# Patient Record
Sex: FEMALE | Race: OTHER | HISPANIC OR LATINO | Employment: PART TIME | URBAN - METROPOLITAN AREA
[De-identification: names, ages, dates, MRNs, and addresses within clinical notes are randomized per-mention and may not be internally consistent; named-entity substitution may affect disease eponyms.]

---
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
## 2019-04-19 ENCOUNTER — OFFICE VISIT (OUTPATIENT)
Dept: URGENT CARE | Age: 19
End: 2019-04-19
Payer: COMMERCIAL

## 2019-04-19 VITALS
BODY MASS INDEX: 19.32 KG/M2 | HEIGHT: 62 IN | DIASTOLIC BLOOD PRESSURE: 69 MMHG | OXYGEN SATURATION: 99 % | TEMPERATURE: 98.9 F | WEIGHT: 105 LBS | HEART RATE: 88 BPM | RESPIRATION RATE: 18 BRPM | SYSTOLIC BLOOD PRESSURE: 114 MMHG

## 2019-04-19 DIAGNOSIS — J02.9 SORE THROAT: ICD-10-CM

## 2019-04-19 DIAGNOSIS — J02.0 STREP PHARYNGITIS: Primary | ICD-10-CM

## 2019-04-19 LAB — S PYO AG THROAT QL: NEGATIVE

## 2019-04-19 PROCEDURE — 87430 STREP A AG IA: CPT | Performed by: PHYSICIAN ASSISTANT

## 2019-04-19 PROCEDURE — 99203 OFFICE O/P NEW LOW 30 MIN: CPT | Performed by: PHYSICIAN ASSISTANT

## 2019-04-19 PROCEDURE — 87147 CULTURE TYPE IMMUNOLOGIC: CPT | Performed by: PHYSICIAN ASSISTANT

## 2019-04-19 PROCEDURE — 87070 CULTURE OTHR SPECIMN AEROBIC: CPT | Performed by: PHYSICIAN ASSISTANT

## 2019-04-19 RX ORDER — AMOXICILLIN 500 MG/1
500 CAPSULE ORAL EVERY 12 HOURS SCHEDULED
Qty: 20 CAPSULE | Refills: 0 | Status: SHIPPED | OUTPATIENT
Start: 2019-04-19 | End: 2019-04-29

## 2019-04-21 LAB — BACTERIA THROAT CULT: ABNORMAL

## 2019-07-02 ENCOUNTER — LAB REQUISITION (OUTPATIENT)
Dept: LAB | Facility: HOSPITAL | Age: 19
End: 2019-07-02
Payer: COMMERCIAL

## 2019-07-02 DIAGNOSIS — Z11.3 ENCOUNTER FOR SCREENING FOR INFECTIONS WITH PREDOMINANTLY SEXUAL MODE OF TRANSMISSION: ICD-10-CM

## 2019-07-02 PROCEDURE — 87591 N.GONORRHOEAE DNA AMP PROB: CPT | Performed by: PEDIATRICS

## 2019-07-02 PROCEDURE — 87491 CHLMYD TRACH DNA AMP PROBE: CPT | Performed by: PEDIATRICS

## 2019-07-05 LAB — MISCELLANEOUS LAB TEST RESULT: NORMAL

## 2019-09-16 NOTE — PROGRESS NOTES
Pt pleasant during interaction  Visible in milieu throughout morning and attending groups  Denies SI and depression  Contracts for safety  Feeling ready for discharge today  No questions/concerns at this time  Spontaneous, unlabored and symmetrical

## 2021-02-10 ENCOUNTER — APPOINTMENT (EMERGENCY)
Dept: ULTRASOUND IMAGING | Facility: HOSPITAL | Age: 21
End: 2021-02-10
Payer: COMMERCIAL

## 2021-02-10 ENCOUNTER — HOSPITAL ENCOUNTER (EMERGENCY)
Facility: HOSPITAL | Age: 21
Discharge: HOME | End: 2021-02-10
Attending: EMERGENCY MEDICINE | Admitting: EMERGENCY MEDICINE
Payer: COMMERCIAL

## 2021-02-10 VITALS
HEART RATE: 81 BPM | SYSTOLIC BLOOD PRESSURE: 125 MMHG | WEIGHT: 105.82 LBS | DIASTOLIC BLOOD PRESSURE: 75 MMHG | BODY MASS INDEX: 19.35 KG/M2 | TEMPERATURE: 98.4 F | OXYGEN SATURATION: 98 % | RESPIRATION RATE: 18 BRPM

## 2021-02-10 DIAGNOSIS — O20.0 THREATENED MISCARRIAGE: Primary | ICD-10-CM

## 2021-02-10 LAB
ABO GROUP BLD: NORMAL
B-HCG SERPL-ACNC: 11 MIU/ML
BACTERIA UR QL AUTO: ABNORMAL /HPF
BASOPHILS # BLD AUTO: 0.02 THOUSANDS/ΜL (ref 0–0.1)
BASOPHILS NFR BLD AUTO: 0 % (ref 0–1)
BILIRUB UR QL STRIP: NEGATIVE
CLARITY UR: CLEAR
COLOR UR: YELLOW
EOSINOPHIL # BLD AUTO: 0.03 THOUSAND/ΜL (ref 0–0.61)
EOSINOPHIL NFR BLD AUTO: 0 % (ref 0–6)
ERYTHROCYTE [DISTWIDTH] IN BLOOD BY AUTOMATED COUNT: 12.7 % (ref 11.6–15.1)
EXT PREG TEST URINE: NEGATIVE
EXT. CONTROL ED NAV: NORMAL
GLUCOSE UR STRIP-MCNC: NEGATIVE MG/DL
HCT VFR BLD AUTO: 38.4 % (ref 34.8–46.1)
HGB BLD-MCNC: 12.7 G/DL (ref 11.5–15.4)
HGB UR QL STRIP.AUTO: ABNORMAL
IMM GRANULOCYTES # BLD AUTO: 0.02 THOUSAND/UL (ref 0–0.2)
IMM GRANULOCYTES NFR BLD AUTO: 0 % (ref 0–2)
KETONES UR STRIP-MCNC: NEGATIVE MG/DL
LEUKOCYTE ESTERASE UR QL STRIP: NEGATIVE
LYMPHOCYTES # BLD AUTO: 1.58 THOUSANDS/ΜL (ref 0.6–4.47)
LYMPHOCYTES NFR BLD AUTO: 17 % (ref 14–44)
MCH RBC QN AUTO: 30.2 PG (ref 26.8–34.3)
MCHC RBC AUTO-ENTMCNC: 33.1 G/DL (ref 31.4–37.4)
MCV RBC AUTO: 91 FL (ref 82–98)
MONOCYTES # BLD AUTO: 0.64 THOUSAND/ΜL (ref 0.17–1.22)
MONOCYTES NFR BLD AUTO: 7 % (ref 4–12)
NEUTROPHILS # BLD AUTO: 6.92 THOUSANDS/ΜL (ref 1.85–7.62)
NEUTS SEG NFR BLD AUTO: 76 % (ref 43–75)
NITRITE UR QL STRIP: NEGATIVE
NON-SQ EPI CELLS URNS QL MICRO: ABNORMAL /HPF
NRBC BLD AUTO-RTO: 0 /100 WBCS
PH UR STRIP.AUTO: 5.5 [PH] (ref 4.5–8)
PLATELET # BLD AUTO: 187 THOUSANDS/UL (ref 149–390)
PMV BLD AUTO: 11 FL (ref 8.9–12.7)
PROT UR STRIP-MCNC: NEGATIVE MG/DL
RBC # BLD AUTO: 4.21 MILLION/UL (ref 3.81–5.12)
RBC #/AREA URNS AUTO: ABNORMAL /HPF
RH BLD: POSITIVE
SP GR UR STRIP.AUTO: >=1.03 (ref 1–1.03)
UROBILINOGEN UR QL STRIP.AUTO: 0.2 E.U./DL
WBC # BLD AUTO: 9.21 THOUSAND/UL (ref 4.31–10.16)
WBC #/AREA URNS AUTO: ABNORMAL /HPF

## 2021-02-10 PROCEDURE — 87086 URINE CULTURE/COLONY COUNT: CPT

## 2021-02-10 PROCEDURE — 85025 COMPLETE CBC W/AUTO DIFF WBC: CPT | Performed by: EMERGENCY MEDICINE

## 2021-02-10 PROCEDURE — 99284 EMERGENCY DEPT VISIT MOD MDM: CPT

## 2021-02-10 PROCEDURE — 81025 URINE PREGNANCY TEST: CPT | Performed by: EMERGENCY MEDICINE

## 2021-02-10 PROCEDURE — 99282 EMERGENCY DEPT VISIT SF MDM: CPT | Performed by: EMERGENCY MEDICINE

## 2021-02-10 PROCEDURE — 81001 URINALYSIS AUTO W/SCOPE: CPT

## 2021-02-10 PROCEDURE — 86900 BLOOD TYPING SEROLOGIC ABO: CPT | Performed by: EMERGENCY MEDICINE

## 2021-02-10 PROCEDURE — 84702 CHORIONIC GONADOTROPIN TEST: CPT | Performed by: EMERGENCY MEDICINE

## 2021-02-10 PROCEDURE — 86901 BLOOD TYPING SEROLOGIC RH(D): CPT | Performed by: EMERGENCY MEDICINE

## 2021-02-10 PROCEDURE — 76815 OB US LIMITED FETUS(S): CPT

## 2021-02-10 PROCEDURE — 36415 COLL VENOUS BLD VENIPUNCTURE: CPT | Performed by: EMERGENCY MEDICINE

## 2021-02-10 NOTE — ED PROVIDER NOTES
History  Chief Complaint   Patient presents with    Vaginal Bleeding - Pregnant     Pt reports positive pregnancy test on Sunday, LMP 1/8/21 and abdominal cramping and spotting that started today  Pt reports passing two small blood clots, denies pad saturation  22 yo female c/o onset of vaginal bleeding today, spotting first, then passed two clots, and now volume similar to menses  LMP 1/8/21 and she said she had positive pregnancy on 2/7/21  History provided by:  Patient  Vaginal Bleeding  Quality:  Clots and typical of menses  Severity:  Moderate  Onset quality:  Gradual  Duration:  1 day  Timing:  Intermittent  Progression:  Waxing and waning  Chronicity:  New  Possible pregnancy: yes (LMP 1/8/21)    Associated symptoms: no abdominal pain, no dizziness, no dysuria, no fever and no nausea        Prior to Admission Medications   Prescriptions Last Dose Informant Patient Reported? Taking?   escitalopram (LEXAPRO) 5 mg tablet Not Taking at Unknown time  No No   Sig: Take 1 tablet (5 mg total) by mouth daily   Patient not taking: Reported on 4/19/2019   lidocaine viscous (XYLOCAINE) 2 % mucosal solution Not Taking at Unknown time  No No   Sig: Swish and spit 15 mL 4 (four) times a day as needed for mild pain   Patient not taking: Reported on 2/10/2021      Facility-Administered Medications: None       Past Medical History:   Diagnosis Date    Migraine        History reviewed  No pertinent surgical history  Family History   Problem Relation Age of Onset    Deep vein thrombosis Maternal Grandfather      I have reviewed and agree with the history as documented  E-Cigarette/Vaping    E-Cigarette Use Never User      E-Cigarette/Vaping Substances     Social History     Tobacco Use    Smoking status: Never Smoker    Smokeless tobacco: Never Used   Substance Use Topics    Alcohol use: No    Drug use: No       Review of Systems   Constitutional: Negative for appetite change, chills and fever  HENT: Negative for sore throat  Respiratory: Negative for cough, shortness of breath and wheezing  Cardiovascular: Negative for chest pain and palpitations  Gastrointestinal: Negative for abdominal pain, diarrhea, nausea and vomiting  Genitourinary: Positive for vaginal bleeding  Negative for dysuria and hematuria  Musculoskeletal: Negative for neck pain  Skin: Negative for rash  Neurological: Negative for dizziness, weakness and headaches  Psychiatric/Behavioral: Negative for suicidal ideas  All other systems reviewed and are negative  Physical Exam  Physical Exam  Vitals signs and nursing note reviewed  Constitutional:       General: She is not in acute distress  Appearance: She is well-developed  She is not diaphoretic  HENT:      Head: Normocephalic and atraumatic  Right Ear: External ear normal       Left Ear: External ear normal       Nose: Nose normal    Eyes:      Conjunctiva/sclera: Conjunctivae normal       Pupils: Pupils are equal, round, and reactive to light  Neck:      Musculoskeletal: Normal range of motion and neck supple  Cardiovascular:      Rate and Rhythm: Normal rate and regular rhythm  Pulmonary:      Effort: Pulmonary effort is normal    Abdominal:      Palpations: Abdomen is soft  Musculoskeletal: Normal range of motion  Skin:     General: Skin is warm and dry  Capillary Refill: Capillary refill takes less than 2 seconds  Findings: No rash  Neurological:      Mental Status: She is alert and oriented to person, place, and time  Gait: Gait normal    Psychiatric:         Behavior: Behavior normal          Thought Content:  Thought content normal          Judgment: Judgment normal          Vital Signs  ED Triage Vitals [02/10/21 1237]   Temperature Pulse Respirations Blood Pressure SpO2   98 4 °F (36 9 °C) 93 18 127/76 100 %      Temp Source Heart Rate Source Patient Position - Orthostatic VS BP Location FiO2 (%)   Oral Monitor Sitting Right arm --      Pain Score       7           Vitals:    02/10/21 1237 02/10/21 1503   BP: 127/76 125/75   Pulse: 93 81   Patient Position - Orthostatic VS: Sitting Sitting         Visual Acuity      ED Medications  Medications - No data to display    Diagnostic Studies  Results Reviewed     Procedure Component Value Units Date/Time    Quantitative hCG [[id number]]  (Abnormal) Collected: 02/10/21 1313    Lab Status: Final result Specimen: Blood from Arm, Right Updated: 02/10/21 1342     HCG, Quant 11 0 mIU/mL     Narrative:       Expected Ranges:     Approximate               Approximate HCG  Gestation age          Concentration ( mIU/mL)  _____________          ______________________   Niya Leary                      HCG values  0 2-1                       5-50  1-2                           2-3                         100-5000  3-4                         500-44834  4-5                         1000-77583  5-6                         [id number]  6-8                         [id number]  8-12                        18639-975005      Urine Microscopic [[id number]]  (Abnormal) Collected: 02/10/21 1309    Lab Status: Final result Specimen: Urine, Clean Catch Updated: 02/10/21 1339     RBC, UA 10-20 /hpf      WBC, UA None Seen /hpf      Epithelial Cells None Seen /hpf      Bacteria, UA None Seen /hpf     CBC and differential [[id number]]  (Abnormal) Collected: 02/10/21 1313    Lab Status: Final result Specimen: Blood from Arm, Right Updated: 02/10/21 1319     WBC 9 21 Thousand/uL      RBC 4 21 Million/uL      Hemoglobin 12 7 g/dL      Hematocrit 38 4 %      MCV 91 fL      MCH 30 2 pg      MCHC 33 1 g/dL      RDW 12 7 %      MPV 11 0 fL      Platelets 092 Thousands/uL      nRBC 0 /100 WBCs      Neutrophils Relative 76 %      Immat GRANS % 0 %      Lymphocytes Relative 17 %      Monocytes Relative 7 %      Eosinophils Relative 0 %      Basophils Relative 0 %      Neutrophils Absolute 6 92 Thousands/µL Immature Grans Absolute 0 02 Thousand/uL      Lymphocytes Absolute 1 58 Thousands/µL      Monocytes Absolute 0 64 Thousand/µL      Eosinophils Absolute 0 03 Thousand/µL      Basophils Absolute 0 02 Thousands/µL     Urine culture [[id number]] Collected: 02/10/21 1309    Lab Status: In process Specimen: Urine, Clean Catch Updated: 02/10/21 1315    POCT urinalysis dipstick [[id number]]  (Abnormal) Resulted: 02/10/21 1314    Lab Status: Final result Specimen: Urine Updated: 02/10/21 1314    POCT pregnancy, urine [[id number]]  (Normal) Resulted: 02/10/21 1314    Lab Status: Final result Updated: 02/10/21 1314     EXT PREG TEST UR (Ref: Negative) negative     Control valid    Urine Macroscopic, POC [[id number]]  (Abnormal) Collected: 02/10/21 1309    Lab Status: Final result Specimen: Urine Updated: 02/10/21 1310     Color, UA Yellow     Clarity, UA Clear     pH, UA 5 5     Leukocytes, UA Negative     Nitrite, UA Negative     Protein, UA Negative mg/dl      Glucose, UA Negative mg/dl      Ketones, UA Negative mg/dl      Urobilinogen, UA 0 2 E U /dl      Bilirubin, UA Negative     Blood, UA Moderate     Specific Gravity, UA >=1 030    Narrative:      CLINITEK RESULT                 US OB pregnancy limited with transvaginal   Final Result by Rodney Norwood MD (02/10 1531)   1  No intrauterine gestation or adnexal mass identified  Differential remains early IUP, spontaneous  and ectopic pregnancy  Correlate with serial quantitative BHCG  2   Variant uterine morphology consistent with either a bicornuate or septate uterus      The study was marked in EPIC for immediate notification              Workstation performed: [id number]                    Procedures  Procedures         ED Course  ED Course as of Feb 10 1604   Wed Feb 10, 2021   1315 Negative, So I will wait for the HCG   PREGNANCY TEST URINE: negative   1348 Very low, concerning for threatened or complete Ab given positive pregnancy at home, changed to negative pregnancy now and vaginal bleeding   HCG QUANTITATIVE(!): 11 0   1535 No IUP, nor ectopic seen, possible bicornate uterus   US OB pregnancy limited with transvaginal   1539 Reviewed results with patient at bedside and updated on the plan  I informed her the most likely is miscarriage, but follow up is important  Referral sent to [facility] for [address] 20yo+      Most Recent Value   SBIRT (23 yo +)   In order to provide better care to our patients, we are screening all of our patients for alcohol and drug use  Would it be okay to ask you these screening questions? No Filed at: 02/10/2021 1337                    MDM    Disposition  Final diagnoses:   Threatened miscarriage     Time reflects when diagnosis was documented in both MDM as applicable and the Disposition within this note     Time User Action Codes Description Comment    2/10/2021  3:36 PM Corrie Chowdary Add [O20 0] Threatened miscarriage       ED Disposition     ED Disposition Condition Date/Time Comment    Discharge Good Wed Feb 10, 2021  3:36 PM Kirit Isabella discharge to home/self care              Follow-up Information     Follow up With Specialties Details Why Contact Info Additional [address] [facility] Call  For followup [address], [address] [facility] [address]  [facility] [place], [address], [address] [place] [address], [address] [phone]          Discharge Medication List as of 2/10/2021  3:44 PM      CONTINUE these medications which have NOT CHANGED    Details   escitalopram (LEXAPRO) 5 mg tablet Take 1 tablet (5 mg total) by mouth daily, Starting Tue 7/24/2018, Print      lidocaine viscous (XYLOCAINE) 2 % mucosal solution Swish and spit 15 mL 4 (four) times a day as needed for mild pain, Starting Fri 4/19/2019, Normal               PDMP Review     None          ED Provider  Electronically Signed by           Germania Coulter MD  02/10/21 9696

## 2021-02-10 NOTE — DISCHARGE INSTRUCTIONS
Threatened Miscarriage   WHAT YOU NEED TO KNOW:   A threatened miscarriage occurs when you have vaginal bleeding within the first 20 weeks of pregnancy  It means that a miscarriage may happen  A threatened miscarriage may also be called a threatened   DISCHARGE INSTRUCTIONS:   Return to the emergency department if:   You feel weak or faint  Your pain or cramping in your abdomen or back gets worse  You have vaginal bleeding that soaks 1 or more pads in an hour  You pass material that looks like tissue or large clots  Contact your healthcare provider or obstetrician if:   You have a fever  You have trouble urinating, burning when you urinate, or feel a need to urinate often  You have new or worsening vaginal bleeding  You have vaginal pain or itching, or vaginal discharge that is yellow, green, or foul-smelling  You have questions or concerns about your condition or care  Self-care: The following may help you manage your symptoms and decrease your risk for a miscarriage:  Do not put anything in your vagina  Do not have sex, douche, or use tampons  These actions may increase your risk for infection and miscarriage  Rest as directed  Do not exercise or do strenuous activities  These activities may cause  labor or miscarriage  Ask your healthcare provider what activities are okay to do  Take prenatal vitamins  These help you get the right amount of vitamins and minerals  They may also decrease the risk of certain birth defects  Do not drink alcohol or use illegal drugs  These can increase your risk for a miscarriage or harm your baby  Do not smoke  Nicotine and other chemicals in cigarettes and cigars can harm your baby and cause miscarriage or  labor  Ask your healthcare provider for information if you currently smoke and need help to quit  E-cigarettes or smokeless tobacco still contain nicotine  Do not use these products       Decrease your risk for an infection  Always wash your hands before eating or preparing meals  Do not spend time with people who are sick  Ask your healthcare provider if you need immunizations such as the flu or hepatitis B vaccine  Immunizations may decrease your risk for infections that could cause a miscarriage  Manage your medical conditions  Keep your blood pressure and blood sugars under control  Maintain a healthy weight during pregnancy    Follow up with your obstetrician as directed:

## 2021-02-11 ENCOUNTER — TELEPHONE (OUTPATIENT)
Dept: OBGYN CLINIC | Facility: CLINIC | Age: 21
End: 2021-02-11

## 2021-02-11 DIAGNOSIS — N91.2 AMENORRHEA: Primary | ICD-10-CM

## 2021-02-11 LAB — BACTERIA UR CULT: NORMAL

## 2021-02-11 NOTE — TELEPHONE ENCOUNTER
Spoke to patient on the phone, she was in the ED yesterday  She presented to the ED with light vaginal bleeding and small clots  LMP is 1/8/21, she states she has regular periods every month  Today she is having heavier bleeding like a period  Her HCG level yesterday in the ED was 11 0  I reviewed her ED visit with Dr Merline Mojica  Patient is to get a repeat HCG level done tomorrow 2/12/21 at the [facility] [id number] [address]  I will review her labs on Monday and discuss with the doctor what type of follow up apt she will need  Patient is agreeable to plan

## 2021-02-12 ENCOUNTER — LAB (OUTPATIENT)
Dept: LAB | Facility: HOSPITAL | Age: 21
End: 2021-02-12
Payer: COMMERCIAL

## 2021-02-12 DIAGNOSIS — N91.2 AMENORRHEA: ICD-10-CM

## 2021-02-12 LAB — B-HCG SERPL-ACNC: 4.5 MIU/ML

## 2021-02-12 PROCEDURE — 84702 CHORIONIC GONADOTROPIN TEST: CPT

## 2021-02-12 PROCEDURE — 36415 COLL VENOUS BLD VENIPUNCTURE: CPT

## 2021-02-15 ENCOUNTER — TELEPHONE (OUTPATIENT)
Dept: OBGYN CLINIC | Facility: CLINIC | Age: 21
End: 2021-02-15

## 2021-02-16 ENCOUNTER — OFFICE VISIT (OUTPATIENT)
Dept: OBGYN CLINIC | Facility: CLINIC | Age: 21
End: 2021-02-16

## 2021-02-16 VITALS
HEART RATE: 80 BPM | SYSTOLIC BLOOD PRESSURE: 120 MMHG | HEIGHT: 62 IN | WEIGHT: 105 LBS | BODY MASS INDEX: 19.32 KG/M2 | DIASTOLIC BLOOD PRESSURE: 80 MMHG

## 2021-02-16 DIAGNOSIS — O03.9 SAB (SPONTANEOUS ABORTION): Primary | ICD-10-CM

## 2021-02-16 PROCEDURE — 99202 OFFICE O/P NEW SF 15 MIN: CPT | Performed by: OBSTETRICS & GYNECOLOGY

## 2021-02-16 NOTE — PROGRESS NOTES
Assessment/Plan:     No problem-specific Assessment & Plan notes found for this encounter  Diagnoses and all orders for this visit:    SAB (spontaneous )    RTO for annual exam     Pt declines birth control at this time  Subjective:      Patient ID: Jax Vazquez is a 23 y o  female  presents as a follow up to the ED  She has an SAB on 02/10/21  Her US is below:    UTERUS:  7 8 x 4 5 x 5 3 cm       ENDOMETRIUM:    8 mm  The endometrial cavity separates towards the fundus  The overlying myometrial contour does not show a depression     OVARIES/ADNEXA:  No adnexal mass evident  There is no free fluid      Right ovary:  3 8 x 2 1 x 1 8 cm  7 mm paraovarian cyst noted  Doppler flow present  No suspicious abnormality      Left ovary:  2 4 x 2 4 x 1 6 cm  Doppler flow present  No suspicious abnormality      IMPRESSION:  1  No intrauterine gestation or adnexal mass identified  Differential remains early IUP, spontaneous  and ectopic pregnancy  Correlate with serial quantitative BHCG  2   Variant uterine morphology consistent with either a bicornuate or septate uterus    Pt is aware of the results  Her quant is now negative  Her blood type is O positive  Pt declines birth control at this time  HPI    The following portions of the patient's history were reviewed and updated as appropriate: allergies, current medications, past family history, past medical history, past social history, past surgical history and problem list     Review of Systems      Objective:      /80   Pulse 80   Ht 5' 2" (1 575 m)   Wt 47 6 kg (105 lb)   LMP 2021   Breastfeeding Unknown   BMI 19 20 kg/m²          Physical Exam  Vitals signs and nursing note reviewed  Constitutional:       Appearance: Normal appearance  Pulmonary:      Effort: Pulmonary effort is normal    Neurological:      General: No focal deficit present        Mental Status: She is alert and oriented to person, place, and time     Psychiatric:         Mood and Affect: Mood normal          Behavior: Behavior normal

## 2021-03-09 ENCOUNTER — TELEPHONE (OUTPATIENT)
Dept: OBGYN CLINIC | Facility: CLINIC | Age: 21
End: 2021-03-09

## 2021-03-15 ENCOUNTER — ANNUAL EXAM (OUTPATIENT)
Dept: OBGYN CLINIC | Facility: CLINIC | Age: 21
End: 2021-03-15

## 2021-03-15 VITALS
BODY MASS INDEX: 18.95 KG/M2 | WEIGHT: 103 LBS | HEIGHT: 62 IN | SYSTOLIC BLOOD PRESSURE: 123 MMHG | DIASTOLIC BLOOD PRESSURE: 86 MMHG

## 2021-03-15 DIAGNOSIS — Z12.4 SCREENING FOR CERVICAL CANCER: ICD-10-CM

## 2021-03-15 DIAGNOSIS — Z01.419 ENCOUNTER FOR GYNECOLOGICAL EXAMINATION (GENERAL) (ROUTINE) WITHOUT ABNORMAL FINDINGS: ICD-10-CM

## 2021-03-15 DIAGNOSIS — Z11.3 SCREENING EXAMINATION FOR SEXUALLY TRANSMITTED DISEASE: Primary | ICD-10-CM

## 2021-03-15 PROCEDURE — G0145 SCR C/V CYTO,THINLAYER,RESCR: HCPCS | Performed by: OBSTETRICS & GYNECOLOGY

## 2021-03-15 PROCEDURE — 87591 N.GONORRHOEAE DNA AMP PROB: CPT | Performed by: OBSTETRICS & GYNECOLOGY

## 2021-03-15 PROCEDURE — 99395 PREV VISIT EST AGE 18-39: CPT | Performed by: OBSTETRICS & GYNECOLOGY

## 2021-03-15 PROCEDURE — 87491 CHLMYD TRACH DNA AMP PROBE: CPT | Performed by: OBSTETRICS & GYNECOLOGY

## 2021-03-15 NOTE — PROGRESS NOTES
Assessment/Plan:     No problem-specific Assessment & Plan notes found for this encounter  Diagnoses and all orders for this visit:    Screening examination for sexually transmitted disease  -     Chlamydia/GC amplified DNA by PCR    Screening for cervical cancer  -     Liquid-based pap, screening; Future  -     Liquid-based pap, screening    Encounter for gynecological examination (general) (routine) without abnormal findings      Depression Screening Follow-up Plan: Patient's depression screening was positive with a PHQ-2 score of   Their PHQ-9 score was 3  Clinically patient does not have depression  No treatment is required  Subjective:      Patient ID: Shankar Howard is a 23 y o  female who presents for annual exam   This is her first pap smear  She agrees to STD testing  She declines any birth control at this time  She offers no complaints at this time  HPI    The following portions of the patient's history were reviewed and updated as appropriate: allergies, current medications, past family history, past medical history, past social history, past surgical history and problem list     Review of Systems      Objective:      /86   Ht 5' 2" (1 575 m)   Wt 46 7 kg (103 lb)   LMP 03/08/2021   BMI 18 84 kg/m²          Physical Exam  Vitals signs and nursing note reviewed  Exam conducted with a chaperone present  Constitutional:       General: She is not in acute distress  Appearance: She is well-developed  HENT:      Head: Normocephalic  Neck:      Thyroid: No thyromegaly  Cardiovascular:      Rate and Rhythm: Normal rate and regular rhythm  Heart sounds: Normal heart sounds  No murmur  Pulmonary:      Effort: Pulmonary effort is normal  No respiratory distress  Breath sounds: Normal breath sounds  No wheezing or rales  Chest:      Chest wall: No tenderness        Breasts:         Right: No swelling, bleeding, inverted nipple, mass, nipple discharge, skin change or tenderness  Left: No swelling, bleeding, inverted nipple, mass, nipple discharge, skin change or tenderness  Abdominal:      General: Bowel sounds are normal  There is no distension  Palpations: Abdomen is soft  There is no mass  Tenderness: There is no abdominal tenderness  There is no guarding or rebound  Genitourinary:     Labia:         Right: No rash, tenderness, lesion or injury  Left: No rash, tenderness, lesion or injury  Vagina: Normal       Cervix: No cervical motion tenderness, discharge or friability  Uterus: Normal        Adnexa:         Right: No mass, tenderness or fullness  Left: No mass, tenderness or fullness  Rectum: No external hemorrhoid  Comments: Cervix very posterior  Skin:     General: Skin is warm and dry  Neurological:      Mental Status: She is alert and oriented to person, place, and time  Psychiatric:         Behavior: Behavior normal          Thought Content:  Thought content normal          Judgment: Judgment normal

## 2021-03-17 LAB
C TRACH DNA SPEC QL NAA+PROBE: NEGATIVE
N GONORRHOEA DNA SPEC QL NAA+PROBE: NEGATIVE

## 2021-03-18 LAB
LAB AP GYN PRIMARY INTERPRETATION: NORMAL
Lab: NORMAL

## 2023-05-04 ENCOUNTER — VBI (OUTPATIENT)
Dept: ADMINISTRATIVE | Facility: OTHER | Age: 23
End: 2023-05-04

## 2024-08-19 ENCOUNTER — OFFICE VISIT (OUTPATIENT)
Dept: FAMILY MEDICINE CLINIC | Facility: CLINIC | Age: 24
End: 2024-08-19
Payer: COMMERCIAL

## 2024-08-19 VITALS
TEMPERATURE: 98.7 F | OXYGEN SATURATION: 95 % | HEART RATE: 94 BPM | HEIGHT: 63 IN | BODY MASS INDEX: 23.39 KG/M2 | DIASTOLIC BLOOD PRESSURE: 70 MMHG | WEIGHT: 132 LBS | SYSTOLIC BLOOD PRESSURE: 100 MMHG

## 2024-08-19 DIAGNOSIS — Z11.59 NEED FOR HEPATITIS C SCREENING TEST: ICD-10-CM

## 2024-08-19 DIAGNOSIS — Z00.00 WELL ADULT EXAM: ICD-10-CM

## 2024-08-19 DIAGNOSIS — R55 VASOVAGAL SYNCOPE: Primary | ICD-10-CM

## 2024-08-19 DIAGNOSIS — Z11.4 SCREENING FOR HIV (HUMAN IMMUNODEFICIENCY VIRUS): ICD-10-CM

## 2024-08-19 DIAGNOSIS — Z12.4 SCREENING FOR CERVICAL CANCER: ICD-10-CM

## 2024-08-19 DIAGNOSIS — M21.611 BUNION OF GREAT TOE OF RIGHT FOOT: ICD-10-CM

## 2024-08-19 PROCEDURE — 99204 OFFICE O/P NEW MOD 45 MIN: CPT | Performed by: FAMILY MEDICINE

## 2024-08-19 NOTE — PROGRESS NOTES
Assessment/Plan:    I did review the records from the emergency department.  She had a fairly extensive workup.  I will follow-up with neurology and consider EEG.    She will follow-up here for an annual well visit at her convenience.       Diagnoses and all orders for this visit:    Vasovagal syncope  -     Ambulatory Referral to Neurology; Future    Bunion of great toe of right foot  -     Ambulatory Referral to Podiatry; Future    Screening for cervical cancer  -     Ambulatory referral to Obstetrics / Gynecology; Future    Need for hepatitis C screening test  -     Hepatitis C Antibody; Future    Screening for HIV (human immunodeficiency virus)  -     HIV 1/2 AG/AB w Reflex [facility] for 2 yr old and above; Future    Well adult exam  -     Hemoglobin A1C  -     Comprehensive metabolic panel  -     CBC and differential  -     TSH, 3rd generation with Free T4 reflex  -     Lipid panel            Subjective:        Patient ID: Briana Smith is a 24 y.o. female.      She is in today for follow-up from the emergency department.  She has been having episodes of syncope and near syncope that have been happening for the last 6 years or so but seem to be more frequent now.  She was seen in the emergency department and evaluated extensively and then discharged.  No causes were found.          The following portions of the patient's history were reviewed and updated as appropriate: allergies, current medications, past family history, past medical history, past social history, past surgical history, and problem list.      Review of Systems   Constitutional: Negative.    HENT: Negative.     Eyes: Negative.    Respiratory: Negative.     Cardiovascular: Negative.    Gastrointestinal: Negative.    Endocrine: Negative.    Genitourinary: Negative.    Musculoskeletal: Negative.    Skin: Negative.    Allergic/Immunologic: Negative.    Neurological:  Positive for syncope.   Hematological: Negative.    Psychiatric/Behavioral: Negative.    "  All other systems reviewed and are negative.          Objective:        Depression Screening and Follow-up Plan: Patient was screened for depression during today's encounter. They screened negative with a PHQ-9 score of 0.          /70 (BP Location: Left arm, Patient Position: Sitting, Cuff Size: Standard)   Pulse 94   Temp 98.7 °F (37.1 °C)   Ht 5' 2.5\" (1.588 m)   Wt 59.9 kg (132 lb)   SpO2 95%   BMI 23.76 kg/m²          Physical Exam  Vitals and nursing note reviewed.   Constitutional:       Appearance: Normal appearance. She is well-developed.   HENT:      Head: Normocephalic and atraumatic.      Right Ear: External ear normal.      Left Ear: External ear normal.      Nose: Nose normal.   Eyes:      Conjunctiva/sclera: Conjunctivae normal.      Pupils: Pupils are equal, round, and reactive to light.   Cardiovascular:      Rate and Rhythm: Normal rate and regular rhythm.      Pulses: Normal pulses.      Heart sounds: Normal heart sounds.   Pulmonary:      Effort: Pulmonary effort is normal.      Breath sounds: Normal breath sounds.   Abdominal:      General: Bowel sounds are normal.      Palpations: Abdomen is soft.   Musculoskeletal:         General: Normal range of motion.      Cervical back: Normal range of motion and neck supple.   Skin:     General: Skin is warm and dry.   Neurological:      General: No focal deficit present.      Mental Status: She is alert and oriented to person, place, and time.      Cranial Nerves: No cranial nerve deficit.      Sensory: No sensory deficit.      Motor: No weakness.      Coordination: Coordination normal.      Gait: Gait normal.      Deep Tendon Reflexes: Reflexes are normal and symmetric. Reflexes normal.   Psychiatric:         Mood and Affect: Mood normal.         Behavior: Behavior normal.         "

## 2025-03-03 ENCOUNTER — CONSULT (OUTPATIENT)
Dept: OBGYN CLINIC | Facility: MEDICAL CENTER | Age: 25
End: 2025-03-03
Payer: COMMERCIAL

## 2025-03-03 VITALS
SYSTOLIC BLOOD PRESSURE: 112 MMHG | DIASTOLIC BLOOD PRESSURE: 60 MMHG | WEIGHT: 128 LBS | BODY MASS INDEX: 23.55 KG/M2 | HEIGHT: 62 IN

## 2025-03-03 DIAGNOSIS — Z12.4 SCREENING FOR CERVICAL CANCER: ICD-10-CM

## 2025-03-03 DIAGNOSIS — Z01.419 ENCOUNTER FOR ROUTINE GYNECOLOGICAL EXAMINATION WITH PAPANICOLAOU SMEAR OF CERVIX: Primary | ICD-10-CM

## 2025-03-03 PROCEDURE — 87591 N.GONORRHOEAE DNA AMP PROB: CPT | Performed by: OBSTETRICS & GYNECOLOGY

## 2025-03-03 PROCEDURE — 99385 PREV VISIT NEW AGE 18-39: CPT | Performed by: OBSTETRICS & GYNECOLOGY

## 2025-03-03 PROCEDURE — G0145 SCR C/V CYTO,THINLAYER,RESCR: HCPCS | Performed by: OBSTETRICS & GYNECOLOGY

## 2025-03-03 PROCEDURE — G0476 HPV COMBO ASSAY CA SCREEN: HCPCS | Performed by: OBSTETRICS & GYNECOLOGY

## 2025-03-03 PROCEDURE — 87491 CHLMYD TRACH DNA AMP PROBE: CPT | Performed by: OBSTETRICS & GYNECOLOGY

## 2025-03-03 RX ORDER — OMEGA-3S/DHA/EPA/FISH OIL/D3 300MG-1000
400 CAPSULE ORAL DAILY
COMMUNITY

## 2025-03-03 NOTE — PROGRESS NOTES
ASSESSMENT & PLAN: Diagnoses and all orders for this visit:    Encounter for routine gynecological examination with Papanicolaou smear of cervix    Screening for cervical cancer  -     Ambulatory referral to Obstetrics / Gynecology    Other orders  -     cholecalciferol (VITAMIN D3) 400 units tablet; Take 400 Units by mouth daily         1.  Routine well woman exam done today  2.  Pap:  The patient's pap is not up to date.  Pap was done today.  Current ASCCP Guidelines reviewed.  3.  STD testing was done. Gonorrhea and Chlamydia to be run off Pap.  4.  Patient has had her Gardasil vaccination.  Recommendations reviewed.  5. The following were reviewed in today's visit: adequate intake of calcium and vitamin D, exercise, and healthy diet.  6. F/u in 1 year for next routine GYN exam.      CC:  Annual Gynecologic Examination    HPI: Briana Smith is a 24 y.o.  who presents for annual gynecologic examination.  She has the following concerns: No complaints.  States menses are regular.  Patient denies issues with dysmenorrhea.  states her menses has been more cloudy.  Patient is not interested in any sort of hormonal intervention at this time.    Health Maintenance:    Patient describes her health as good.  The last health maintenance visit was 4 years ago.  Patient does not have weight concerns.  She exercises 5 days per week with going to gym- weights and cardio.    She does wear her seatbelt routinely.    She does not perform regular monthly self breast exams.    She does feels safe at home.   Patients does not follow a special diet.  Patients gets 1 servings of dairy or calcium rich foods a day.    Last pap: 4 years ago nl    Patient Active Problem List   Diagnosis    Intentional drug overdose (HCC)    Current severe episode of major depressive disorder without prior episode (HCC)    Mood disorder (HCC)    Vasovagal syncope    Bunion of great toe of right foot       Past Medical History:   Diagnosis Date     "Migraine     with nausea and dizziness       Past Surgical History:   Procedure Laterality Date    HERNIA REPAIR Right        Past OB/Gyn History:  Pt does not have menstrual issues.   History of sexually transmitted infection: No.  History of abnormal pap smears: No.    Patient is currently sexually active.  heterosexual. The current method of family planning is none.    Family History   Problem Relation Age of Onset    Deep vein thrombosis Maternal Grandfather     Deep vein thrombosis Maternal Aunt     Deep vein thrombosis Other     No Known Problems Mother     No Known Problems Father        Social History:  Social History     Socioeconomic History    Marital status: Single     Spouse name: Not on file    Number of children: Not on file    Years of education: Not on file    Highest education level: Not on file   Occupational History    Occupation:  worker   Tobacco Use    Smoking status: Never    Smokeless tobacco: Never   Vaping Use    Vaping status: Never Used   Substance and Sexual Activity    Alcohol use: No    Drug use: No    Sexual activity: Yes     Partners: Male     Birth control/protection: None   Other Topics Concern    Not on file   Social History Narrative    Not on file     Social Drivers of Health     Financial Resource Strain: Not on file   Food Insecurity: Not on file   Transportation Needs: Not on file   Physical Activity: Not on file   Stress: Not on file   Social Connections: Not on file   Intimate Partner Violence: Not on file   Housing Stability: Not on file     Presently lives with mother, brother and \"boyfriend\".  Patient is currently employed .    No Known Allergies      Current Outpatient Medications:     cholecalciferol (VITAMIN D3) 400 units tablet, Take 400 Units by mouth daily, Disp: , Rfl:       Review of Systems  Constitutional :no fever, feels well, no tiredness, no recent weight gain or loss  ENT: no ear ache, no loss of hearing, no nosebleeds or nasal discharge, " "no sore throat or hoarseness.  Cardiovascular: no complaints of slow or fast heart beat, no chest pain, no palpitations, no leg claudication or lower extremity edema.  Respiratory: no complaints of shortness of shortness of breath, no JOLLEY  Breasts:no complaints of breast pain, breast lump, or nipple discharge  Gastrointestinal: no complaints of abdominal pain, constipation, nausea, vomiting, or diarrhea or bloody stools  Genitourinary : no complaints of dysuria, incontinence, pelvic pain, no dysmenorrhea, vaginal discharge or abnormal vaginal bleeding and as noted in HPI.  Musculoskeletal: no complaints of arthralgia, no myalgia, no joint swelling or stiffness, no limb pain or swelling.  Integumentary: no complaints of skin rash or lesion, itching or dry skin  Neurological: no complaints of headache, no confusion, no numbness or tingling, no dizziness or fainting      Physical Exam:   /60   Ht 5' 2\" (1.575 m)   Wt 58.1 kg (128 lb)   LMP 02/14/2025 (Exact Date)   BMI 23.41 kg/m²     General: appears stated age, cooperative, alert normal mood and affect   Psychiatric oriented to person, place and time.  Mood and affect normal   Neck: normal, supple,trachea midline, no masses.  Thyroid: normal, no thyromegaly   Heart: regular rate and rhythm, S1, S2 normal, no murmur, click, rub or gallop   Lungs: clear to auscultation bilaterally, no increased work of breathing or signs of respiratory distress   Breasts: normal, no dimpling or skin changes noted, sm f-c changes outer quad b/l   Abdomen: soft, non-tender, without masses or organomegaly   Vulva: normal , no lesions   Vagina: normal , no lesions or dryness   Urethra: normal   Urethal meatus normal   Bladder Normal, soft, non-tender and no prolapse or masses appreciated   Cervix: normal, no palpable masses    Uterus: normal , non-tender, not enlarged, no palpable masses   Adnexa: normal, non-tender without fullness or masses   Lymphatic Palpation of lymph nodes " in neck, axilla, groin and/or other locations: no lymphadenopathy or masses noted   Skin Normal skin turgor and no rashes.  Palpation of skin and subcutaneous tissue normal.

## 2025-03-04 LAB
HPV HR 12 DNA CVX QL NAA+PROBE: NEGATIVE
HPV16 DNA CVX QL NAA+PROBE: NEGATIVE
HPV18 DNA CVX QL NAA+PROBE: NEGATIVE

## 2025-03-05 ENCOUNTER — RESULTS FOLLOW-UP (OUTPATIENT)
Dept: OBGYN CLINIC | Facility: MEDICAL CENTER | Age: 25
End: 2025-03-05

## 2025-03-05 DIAGNOSIS — N76.0 BACTERIAL VAGINOSIS: Primary | ICD-10-CM

## 2025-03-05 DIAGNOSIS — B96.89 BACTERIAL VAGINOSIS: Primary | ICD-10-CM

## 2025-03-05 LAB
C TRACH DNA SPEC QL NAA+PROBE: NEGATIVE
N GONORRHOEA DNA SPEC QL NAA+PROBE: NEGATIVE

## 2025-03-06 LAB
LAB AP GYN PRIMARY INTERPRETATION: NORMAL
Lab: NORMAL
PATH INTERP SPEC-IMP: NORMAL

## 2025-03-07 RX ORDER — METRONIDAZOLE 500 MG/1
500 TABLET ORAL EVERY 12 HOURS SCHEDULED
Qty: 14 TABLET | Refills: 0 | Status: SHIPPED | OUTPATIENT
Start: 2025-03-07 | End: 2025-03-14

## 2025-03-07 NOTE — RESULT ENCOUNTER NOTE
Please notify pt o nl pap and neg HPV, GC and CHl.  BV seen on pap.  Rx for flagyl sent to pt's pharmacy.

## 2025-04-10 ENCOUNTER — NURSE TRIAGE (OUTPATIENT)
Age: 25
End: 2025-04-10

## 2025-04-10 NOTE — TELEPHONE ENCOUNTER
"FOLLOW UP: warm transfer to [place], clerical for scheduling assistance(no availability in book it)    REASON FOR CONVERSATION: Vaginitis    SYMPTOMS: vaginal burning-feels like cuts,itching, pain when urine comes in contact with labia. Pink tinge on toilet paper with wiping    OTHER: onset 3 days ago, increase water intake, Aquaphor externally to provide barrier/comfort.      DISPOSITION: Warm Transfer to Office Clnical Pool (GYN/ONC) For Urgent Follow-Up, See Today or Tomorrow in Office          Reason for Disposition   MODERATE-SEVERE itching (i.e., interferes with school, work, or sleep)    Answer Assessment - Initial Assessment Questions  1. SYMPTOM: \"What's the main symptom you're concerned about?\" (e.g., pain, itching, dryness)      Itching/burning  2. LOCATION: \"Where is the  itch/burn located?\" (e.g., inside/outside, left/right)      Inside vagina  3. ONSET: \"When did the  itch/burn  start?\"      3 days ago  4. PAIN: \"Is there any pain?\" If Yes, ask: \"How bad is it?\" (Scale: 1-10; mild, moderate, severe)      Burning at end urinary stream,  5. ITCHING: \"Is there any itching?\" If Yes, ask: \"How bad is it?\" (Scale: 1-10; mild, moderate, severe)      Moderate   6. CAUSE: \"What do you think is causing the discharge?\" \"Have you had the same problem before?\" \"What happened then?\"      denies  7. OTHER SYMPTOMS: \"Do you have any other symptoms?\" (e.g., fever, itching, vaginal bleeding, pain with urination, injury to genital area, vaginal foreign body)      Pain with urinating-end of stream, feels like cuts in labiadenies pelvic pain, back pain, fever  8. PREGNANCY: \"Is there any chance you are pregnant?\" \"When was your last menstrual period?\"      4/4/2025    Protocols used: Vaginal Symptoms-Adult-[place]    "

## 2025-04-10 NOTE — TELEPHONE ENCOUNTER
Regarding: vaginal itching and burning  ----- Message from Nery VILLA sent at 4/10/2025  8:50 AM EDT -----  Patient has vaginal itching and burning also when urinating. She is a [facility] pt.

## 2025-04-14 ENCOUNTER — OFFICE VISIT (OUTPATIENT)
Dept: OBGYN CLINIC | Facility: CLINIC | Age: 25
End: 2025-04-14
Payer: COMMERCIAL

## 2025-04-14 VITALS
DIASTOLIC BLOOD PRESSURE: 66 MMHG | BODY MASS INDEX: 24.11 KG/M2 | HEIGHT: 62 IN | SYSTOLIC BLOOD PRESSURE: 118 MMHG | WEIGHT: 131 LBS

## 2025-04-14 DIAGNOSIS — B37.31 YEAST INFECTION OF THE VAGINA: Primary | ICD-10-CM

## 2025-04-14 DIAGNOSIS — N89.8 VAGINAL IRRITATION: ICD-10-CM

## 2025-04-14 PROCEDURE — 99214 OFFICE O/P EST MOD 30 MIN: CPT | Performed by: OBSTETRICS & GYNECOLOGY

## 2025-04-14 PROCEDURE — 87660 TRICHOMONAS VAGIN DIR PROBE: CPT | Performed by: OBSTETRICS & GYNECOLOGY

## 2025-04-14 PROCEDURE — 87480 CANDIDA DNA DIR PROBE: CPT | Performed by: OBSTETRICS & GYNECOLOGY

## 2025-04-14 PROCEDURE — 87491 CHLMYD TRACH DNA AMP PROBE: CPT | Performed by: OBSTETRICS & GYNECOLOGY

## 2025-04-14 PROCEDURE — 81001 URINALYSIS AUTO W/SCOPE: CPT | Performed by: OBSTETRICS & GYNECOLOGY

## 2025-04-14 PROCEDURE — 87086 URINE CULTURE/COLONY COUNT: CPT | Performed by: OBSTETRICS & GYNECOLOGY

## 2025-04-14 PROCEDURE — 87591 N.GONORRHOEAE DNA AMP PROB: CPT | Performed by: OBSTETRICS & GYNECOLOGY

## 2025-04-14 PROCEDURE — 87510 GARDNER VAG DNA DIR PROBE: CPT | Performed by: OBSTETRICS & GYNECOLOGY

## 2025-04-14 RX ORDER — NYSTATIN AND TRIAMCINOLONE ACETONIDE 100000; 1 [USP'U]/G; MG/G
OINTMENT TOPICAL 2 TIMES DAILY
Qty: 30 G | Refills: 1 | Status: SHIPPED | OUTPATIENT
Start: 2025-04-14

## 2025-04-14 RX ORDER — FLUCONAZOLE 150 MG/1
150 TABLET ORAL EVERY OTHER DAY
Qty: 2 TABLET | Refills: 0 | Status: SHIPPED | OUTPATIENT
Start: 2025-04-14 | End: 2025-04-17

## 2025-04-14 NOTE — PROGRESS NOTES
"Name: Briana Smith      : 2000      MRN: [id number]  Encounter Provider: Radha Herrmann MD  Encounter Date: 2025   Enc[facility]  :  Assessment & Plan  Vaginal irritation    Orders:  •  Urine culture  •  Urinalysis with microscopic  •  Chlamydia/GC amplified DNA by PCR  •  Vaginosis DNA Probe    Yeast infection of the vagina    Orders:  •  fluconazole (DIFLUCAN) 150 mg tablet; Take 1 tablet (150 mg total) by mouth every other day for 2 doses  •  nystatin-triamcinolone (MYCOLOG-II) ointment; Apply topically 2 (two) times a day        History of Present Illness   HPI  Briana Smith is a 24 y.o. female who presents for 1 week of vulvar irritation. States she was treated for BV one month ago  . States she has  been having vulvar  irritation no  discharge  and no odor . Nothing has changed on  her  hygiene      History obtained from: patient    Review of Systems  Past Medical History   Past Medical History:   Diagnosis Date   • Migraine     with nausea and dizziness     Past Surgical History:   Procedure Laterality Date   • HERNIA REPAIR Right      Family History   Problem Relation Age of Onset   • Deep vein thrombosis Maternal Grandfather    • Deep vein thrombosis Maternal Aunt    • Deep vein thrombosis Other    • No Known Problems Mother    • No Known Problems Father       reports that she has never smoked. She has never used smokeless tobacco. She reports that she does not drink alcohol and does not use drugs.  Current Outpatient Medications   Medication Instructions   • cholecalciferol (VITAMIN D3) 400 Units, Daily   • fluconazole (DIFLUCAN) 150 mg, Oral, Every other day   • nystatin-triamcinolone (MYCOLOG-II) ointment Topical, 2 times daily   No Known Allergies      Objective   /66   Ht 5' 2\" (1.575 m)   Wt 59.4 kg (131 lb)   LMP 2025 (Exact Date)   BMI 23.96 kg/m²      Physical Exam  Constitutional:       Appearance: Normal " appearance. She is normal weight.   HENT:      Head: Normocephalic and atraumatic.   Eyes:      Conjunctiva/sclera: Conjunctivae normal.   Pulmonary:      Effort: Pulmonary effort is normal.   Genitourinary:     General: Normal vulva.      Vagina: Normal.      Cervix: Normal.      Uterus: Normal.       Comments: Irritation  at  introitus , there is chunky white discharge noted  on exam     Neurological:      Mental Status: She is alert.

## 2025-04-15 ENCOUNTER — RESULTS FOLLOW-UP (OUTPATIENT)
Dept: OBGYN CLINIC | Facility: MEDICAL CENTER | Age: 25
End: 2025-04-15

## 2025-04-15 DIAGNOSIS — N76.0 BV (BACTERIAL VAGINOSIS): Primary | ICD-10-CM

## 2025-04-15 DIAGNOSIS — B37.9 YEAST DETECTED: ICD-10-CM

## 2025-04-15 DIAGNOSIS — B96.89 BV (BACTERIAL VAGINOSIS): Primary | ICD-10-CM

## 2025-04-15 LAB
BACTERIA UR CULT: NORMAL
BACTERIA UR QL AUTO: ABNORMAL /HPF
BILIRUB UR QL STRIP: NEGATIVE
C TRACH DNA SPEC QL NAA+PROBE: NEGATIVE
CANDIDA RRNA VAG QL PROBE: DETECTED
CLARITY UR: CLEAR
COLOR UR: YELLOW
G VAGINALIS RRNA GENITAL QL PROBE: DETECTED
GLUCOSE UR STRIP-MCNC: NEGATIVE MG/DL
HGB UR QL STRIP.AUTO: NEGATIVE
KETONES UR STRIP-MCNC: NEGATIVE MG/DL
LEUKOCYTE ESTERASE UR QL STRIP: ABNORMAL
MUCOUS THREADS UR QL AUTO: ABNORMAL
N GONORRHOEA DNA SPEC QL NAA+PROBE: NEGATIVE
NITRITE UR QL STRIP: NEGATIVE
NON-SQ EPI CELLS URNS QL MICRO: ABNORMAL /HPF
PH UR STRIP.AUTO: 6 [PH]
PROT UR STRIP-MCNC: ABNORMAL MG/DL
RBC #/AREA URNS AUTO: ABNORMAL /HPF
SP GR UR STRIP.AUTO: 1.02 (ref 1–1.03)
T VAGINALIS RRNA GENITAL QL PROBE: NOT DETECTED
UROBILINOGEN UR STRIP-ACNC: <2 MG/DL
WBC #/AREA URNS AUTO: ABNORMAL /HPF

## 2025-04-15 RX ORDER — FLUCONAZOLE 150 MG/1
150 TABLET ORAL ONCE
Qty: 1 TABLET | Refills: 0 | Status: SHIPPED | OUTPATIENT
Start: 2025-04-15 | End: 2025-04-15

## 2025-04-15 RX ORDER — METRONIDAZOLE 500 MG/1
500 TABLET ORAL EVERY 12 HOURS SCHEDULED
Qty: 14 TABLET | Refills: 0 | Status: SHIPPED | OUTPATIENT
Start: 2025-04-15 | End: 2025-04-22

## 2025-04-16 NOTE — TELEPHONE ENCOUNTER
----- Message from April H sent at 4/15/2025  5:22 PM EDT -----  Call to pt and reviewed results/prescriptions sent to pharmacy. Pt advised to avoid alcohol while taking the flagyl. No available upcoming appointments with Dr. Herrmann. Advised someone will be reaching out to schedule. Pt thankful.

## 2025-04-16 NOTE — TELEPHONE ENCOUNTER
Who called:STAFF     Is the patient Pregnant ? No  If so, How many weeks? N/A    Reason for the Call: Schedule appointment to discuss results    Action Taken: Spoke to patient      Outcome/Plan/ Recommendations:  Scheduled patient on 5/1.

## 2025-04-29 ENCOUNTER — TELEPHONE (OUTPATIENT)
Age: 25
End: 2025-04-29

## 2025-04-29 NOTE — TELEPHONE ENCOUNTER
Patient had called wanting to reschedule the 5/1/25 appointment that she has with Dr. Herrmann. Attempted to warm transfer to office clerical since this was for a follow up to go over results but I was unable to get a hold of anyone. Please advise patient can be reached at [phone].

## 2025-04-30 ENCOUNTER — PATIENT MESSAGE (OUTPATIENT)
Dept: OBGYN CLINIC | Facility: CLINIC | Age: 25
End: 2025-04-30

## 2025-04-30 NOTE — TELEPHONE ENCOUNTER
Called patient in regards to rescheduling appointment for 5/1/25. Unable to leave voice mail, line disconnected. Sent MyChart for patient to call back to schedule.

## 2025-05-16 ENCOUNTER — NURSE TRIAGE (OUTPATIENT)
Age: 25
End: 2025-05-16

## 2025-05-16 NOTE — PATIENT COMMUNICATION
Called pt and made her aware that I clarified with  she does not need a follow up that message was a mistake.

## 2025-05-16 NOTE — TELEPHONE ENCOUNTER
"FOLLOW UP: routing to provider for further recommendations     REASON FOR CONVERSATION: Vaginal Discharge    SYMPTOMS: vaginal discharge, vaginal itching, vaginal redness    OTHER: Patient called office complaining of white, cottage cheese like vaginal discharge, vaginal itching and vaginal redness that started about 5 days ago. She was recently treated for vaginal yeast infection on 4/14. She denies odor, abdominal pain, pain with urination, or any other symptoms to note at this time. Patient requests medication at this time. Will reach out to provider if office visit is needed .   Advised patient to wear cotton underwear and keep area clean and dry. Also advised she avoid scented soaps, feminine hygeine products and anything in the vagina until symptoms are resolved. Advised she call back with any new or worsening symptoms that occur.       DISPOSITION: Discuss with Provider and Call Back Patient (overriding Home Care)    Reason for Disposition   Symptoms of a vaginal yeast infection (i.e., white, thick, cottage-cheese-like, itchy, not bad smelling discharge)    Answer Assessment - Initial Assessment Questions  1. DISCHARGE: \"Describe the discharge.\" (e.g., white, yellow, green, gray, foamy, cottage cheese-like)      Cottage cheese white discharge   2. ODOR: \"Is there a bad odor?\"      No   3. ONSET: \"When did the discharge begin?\"      5 days ago   4. RASH: \"Is there a rash in the genital area?\" If Yes, ask: \"Describe it.\" (e.g., redness, blisters, sores, bumps)      Redness in inside   5. ABDOMEN PAIN: \"Are you having any abdomen pain?\" If Yes, ask: \"What does it feel like? \" (e.g., crampy, dull, intermittent, constant)       No   6. ABDOMEN PAIN SEVERITY: If present, ask: \"How bad is it?\" (e.g., Scale 1-10; mild, moderate, or severe)      None   7. CAUSE: \"What do you think is causing the discharge?\" \"Have you had the same problem before?\" \"What happened then?\"      Yeast   8. OTHER SYMPTOMS: \"Do you have any " "other symptoms?\" (e.g., fever, itching, vaginal bleeding, pain with urination, injury to genital area, vaginal foreign body)      Itching - inside ,    9. PREGNANCY: \"Is there any chance you are pregnant?\" \"When was your last menstrual period?\"      No. LMP: 4/29/25    Protocols used: Vaginal Discharge-Adult-[place]  \"How many yeast infections have you had in the past 2 years?\"    -If 1 or less, prescribe Diflucan 150mg PO. If no improvement after 1 dose treatment, please instruct patient to call back to schedule appointment. (should be seen within 3 days)    -If more than 4 or more yeast infections in a year or if they are recurrent, schedule appointment within 3 days.    For OB patients: if patient wishes to use over the counter monistat, recommend only the 7 day treatment.     "

## 2025-05-19 DIAGNOSIS — N89.8 VAGINAL IRRITATION: Primary | ICD-10-CM

## 2025-05-19 RX ORDER — FLUCONAZOLE 150 MG/1
150 TABLET ORAL ONCE
Qty: 1 TABLET | Refills: 0 | Status: SHIPPED | OUTPATIENT
Start: 2025-05-19 | End: 2025-05-19

## 2025-05-19 NOTE — TELEPHONE ENCOUNTER
"Called patient and advised per Dr. Herrmann :      \"Diflucan sent   If  no improvement  then needs visit \"    She verbalized understanding and offers no further questions or concerns at this time.   "